# Patient Record
Sex: MALE | Race: WHITE | Employment: UNEMPLOYED | URBAN - METROPOLITAN AREA
[De-identification: names, ages, dates, MRNs, and addresses within clinical notes are randomized per-mention and may not be internally consistent; named-entity substitution may affect disease eponyms.]

---

## 2017-01-12 ENCOUNTER — TELEPHONE (OUTPATIENT)
Dept: FAMILY MEDICINE | Facility: CLINIC | Age: 42
End: 2017-01-12

## 2017-01-12 NOTE — TELEPHONE ENCOUNTER
Date and Result of Last PT/INR:   INR      0.9   11/28/2016  INR      0.9   11/22/2016  INR      5.1   1/29/2016  INR      1.3   1/14/2016  PT      44.8   1/29/2016  PT      23.0   8/28/2014         INR DUE: 12/5/2016    Please call pt to schedule INR - call one time send letter and close encounter     Thank you     Elba Wolff RN, BSN  [facility]

## 2017-01-12 NOTE — Clinical Note
[address] [address]  Phone: [phone]   January 12, 2017    Mike Joseph  [address] [facility] [address]      Dear Mike,    My staff have been attempting to reach you: you are due for an INR     Your last INR was on 11/28/2016, an INR was due 12/5/16.      We need to monitor your INR while you are on coumadin or also known as warfarin. This blood test is a vital part in your treatment to ensure you are taking the correct dose.     Please call the clinic to make this appointment.     We look forward to seeing you    Anticoagulation nursing staff at [place]    Sincerely,            Torito Powers M.D./RN Anticoagulation staff

## 2017-01-12 NOTE — TELEPHONE ENCOUNTER
My staff have been attempting to reach you: you are due for an INR     Your last INR was on 11/28/2016, an INR was due 12/5/16.      We need to monitor you INR while you are on coumadin or also know as warfarin. This blood test is a vital part in your treatment to ensure you are taking the correct dose.     Please call the clinic to make this appointment.     We look forward to seeing you    Anticoagulation nursing staff at [place]      Left message, sent letter.  Dottie Hodge RN

## 2017-01-14 DIAGNOSIS — I82.409 DVT (DEEP VENOUS THROMBOSIS) (H): ICD-10-CM

## 2017-01-14 DIAGNOSIS — Z79.01 LONG-TERM (CURRENT) USE OF ANTICOAGULANTS: Primary | ICD-10-CM

## 2017-01-16 NOTE — TELEPHONE ENCOUNTER
warfarin    Last Written Prescription Date: 11/28/2016  Last Fill Qty: 30, # refills: 0  Last Office [facility] or [facility] prescribing provider: 1/14/2016       Date and Result of Last PT/INR:   INR      0.9   11/28/2016  INR      0.9   11/22/2016  INR      5.1   1/29/2016  INR      1.3   1/14/2016  PT      44.8   1/29/2016  PT      23.0   8/28/2014     Mable Hodge contacted [place] on 01/16/2017 and left a message. If patient calls back please schedule INR appointment as soon as possible and contact RN team.  Letter sent last week regarding pt needing OV/INR as well.  Dottie Hodge RN

## 2017-01-20 NOTE — TELEPHONE ENCOUNTER
Pharmacy calling back regarding pts script, was notified of below, we have tried to contact pt.  Pharmacy will notify pt.  Dottie Hodge RN

## 2017-01-30 NOTE — TELEPHONE ENCOUNTER
Called # [phone]    Left a non detailed VM     Elba Wolff RN, BSN  [facility]

## 2017-02-03 RX ORDER — WARFARIN SODIUM 5 MG/1
TABLET ORAL
Qty: 7 TABLET | Refills: 0 | Status: SHIPPED | OUTPATIENT
Start: 2017-02-03 | End: 2017-03-07

## 2017-02-03 NOTE — TELEPHONE ENCOUNTER
Attempt # 3    Called # [phone]    Pt made an OV for 2/7/2017     Huddled with Md Kishan Jay for 7 days worth     Refill per RN protocol     Elba Wolff RN, BSN  [facility]

## 2017-03-07 ENCOUNTER — OFFICE VISIT (OUTPATIENT)
Dept: FAMILY MEDICINE | Facility: CLINIC | Age: 42
End: 2017-03-07
Payer: MEDICARE

## 2017-03-07 VITALS
HEART RATE: 85 BPM | HEIGHT: 73 IN | OXYGEN SATURATION: 98 % | BODY MASS INDEX: 32.47 KG/M2 | DIASTOLIC BLOOD PRESSURE: 80 MMHG | WEIGHT: 245 LBS | TEMPERATURE: 98.6 F | SYSTOLIC BLOOD PRESSURE: 120 MMHG

## 2017-03-07 DIAGNOSIS — Z86.711 PERSONAL HISTORY OF PULMONARY EMBOLISM: ICD-10-CM

## 2017-03-07 DIAGNOSIS — F17.200 TOBACCO DEPENDENCE SYNDROME: ICD-10-CM

## 2017-03-07 DIAGNOSIS — Z13.6 CARDIOVASCULAR SCREENING; LDL GOAL LESS THAN 160: ICD-10-CM

## 2017-03-07 DIAGNOSIS — Z00.00 ROUTINE GENERAL MEDICAL EXAMINATION AT A HEALTH CARE FACILITY: Primary | ICD-10-CM

## 2017-03-07 DIAGNOSIS — F39 EPISODIC MOOD DISORDER (H): ICD-10-CM

## 2017-03-07 DIAGNOSIS — Z86.711 HISTORY OF PULMONARY EMBOLISM: ICD-10-CM

## 2017-03-07 DIAGNOSIS — I82.4Y1 DEEP VEIN THROMBOSIS (DVT) OF PROXIMAL VEIN OF RIGHT LOWER EXTREMITY, UNSPECIFIED CHRONICITY (H): ICD-10-CM

## 2017-03-07 DIAGNOSIS — S06.9X0D TRAUMATIC BRAIN INJURY, WITHOUT LOSS OF CONSCIOUSNESS, SUBSEQUENT ENCOUNTER: ICD-10-CM

## 2017-03-07 DIAGNOSIS — Z79.01 LONG-TERM (CURRENT) USE OF ANTICOAGULANTS: ICD-10-CM

## 2017-03-07 LAB
ANION GAP SERPL CALCULATED.3IONS-SCNC: 6 MMOL/L (ref 3–14)
BUN SERPL-MCNC: 14 MG/DL (ref 7–30)
CALCIUM SERPL-MCNC: 8.8 MG/DL (ref 8.5–10.1)
CHLORIDE SERPL-SCNC: 108 MMOL/L (ref 94–109)
CO2 SERPL-SCNC: 27 MMOL/L (ref 20–32)
CREAT SERPL-MCNC: 0.89 MG/DL (ref 0.66–1.25)
ERYTHROCYTE [DISTWIDTH] IN BLOOD BY AUTOMATED COUNT: 11.9 % (ref 10–15)
GFR SERPL CREATININE-BSD FRML MDRD: NORMAL ML/MIN/1.7M2
GLUCOSE SERPL-MCNC: 89 MG/DL (ref 70–99)
HCT VFR BLD AUTO: 41.6 % (ref 40–53)
HGB BLD-MCNC: 14.6 G/DL (ref 13.3–17.7)
MCH RBC QN AUTO: 30.9 PG (ref 26.5–33)
MCHC RBC AUTO-ENTMCNC: 35.1 G/DL (ref 31.5–36.5)
MCV RBC AUTO: 88 FL (ref 78–100)
PLATELET # BLD AUTO: 302 10E9/L (ref 150–450)
POTASSIUM SERPL-SCNC: 4.3 MMOL/L (ref 3.4–5.3)
RBC # BLD AUTO: 4.73 10E12/L (ref 4.4–5.9)
SODIUM SERPL-SCNC: 141 MMOL/L (ref 133–144)
WBC # BLD AUTO: 7.6 10E9/L (ref 4–11)

## 2017-03-07 PROCEDURE — 36415 COLL VENOUS BLD VENIPUNCTURE: CPT | Performed by: FAMILY MEDICINE

## 2017-03-07 PROCEDURE — 99396 PREV VISIT EST AGE 40-64: CPT | Performed by: FAMILY MEDICINE

## 2017-03-07 PROCEDURE — 80048 BASIC METABOLIC PNL TOTAL CA: CPT | Performed by: FAMILY MEDICINE

## 2017-03-07 PROCEDURE — 85027 COMPLETE CBC AUTOMATED: CPT | Performed by: FAMILY MEDICINE

## 2017-03-07 RX ORDER — WARFARIN SODIUM 4 MG/1
4 TABLET ORAL DAILY
Qty: 90 TABLET | Refills: 1 | Status: SHIPPED | OUTPATIENT
Start: 2017-03-07

## 2017-03-07 RX ORDER — WARFARIN SODIUM 5 MG/1
5 TABLET ORAL DAILY
Qty: 90 TABLET | Refills: 1 | Status: SHIPPED | OUTPATIENT
Start: 2017-03-07

## 2017-03-07 NOTE — LETTER
[address] [address]                  [phone]   March 8, 2017    Mike Joseph  [address] [place] [address]      Dear Mike,    Here is a summary of your recent test results:    -Kidney function is normal (Cr, GFR), Sodium is normal, Potassium is normal, Calcium is normal, Glucose is normal (diabetes screening test).   -Normal red blood cell (hgb) levels, normal white blood cell count and normal platelet levels.     For additional lab test information, labtestsonline.org is an excellent reference.     Your test results are enclosed.      Please contact me if you have any questions.    In addition, here is a list of due or overdue Health Maintenance reminders.    Health Maintenance Due   Topic Date Due     INR CLINIC REFERRAL - yearly  02/27/2013     Cholesterol Lab - every 5 years  09/28/2014       Please call us at [phone] (or use [employer]) to address the above recommendations.            Thank you very much for trusting [facility]..     Healthy regards,          Torito Powers M.D.        Results for orders placed or performed in visit on 03/07/17   CBC with platelets   Result Value Ref Range    WBC 7.6 4.0 - 11.0 10e9/L    RBC Count 4.73 4.4 - 5.9 10e12/L    Hemoglobin 14.6 13.3 - 17.7 g/dL    Hematocrit 41.6 40.0 - 53.0 %    MCV 88 78 - 100 fl    MCH 30.9 26.5 - 33.0 pg    MCHC 35.1 31.5 - 36.5 g/dL    RDW 11.9 10.0 - 15.0 %    Platelet Count 302 150 - 450 10e9/L   Basic metabolic panel   Result Value Ref Range    Sodium 141 133 - 144 mmol/L    Potassium 4.3 3.4 - 5.3 mmol/L    Chloride 108 94 - 109 mmol/L    Carbon Dioxide 27 20 - 32 mmol/L    Anion Gap 6 3 - 14 mmol/L    Glucose 89 70 - 99 mg/dL    Urea Nitrogen 14 7 - 30 mg/dL    Creatinine 0.89 0.66 - 1.25 mg/dL    GFR Estimate >90  Non  GFR Calc   >60 mL/min/1.7m2    GFR Estimate If Black >90   GFR Calc   >60  mL/min/1.7m2    Calcium 8.8 8.5 - 10.1 mg/dL

## 2017-03-07 NOTE — NURSING NOTE
"Chief Complaint   Patient presents with     Physical       Initial /80  Pulse 85  Temp 98.6  F (37  C) (Oral)  Ht 6' 1\" (1.854 m)  Wt 245 lb (111.1 kg)  SpO2 98%  BMI 32.32 kg/m2 Estimated body mass index is 32.32 kg/(m^2) as calculated from the following:    Height as of this encounter: 6' 1\" (1.854 m).    Weight as of this encounter: 245 lb (111.1 kg).  Medication Reconciliation: complete  "

## 2017-03-07 NOTE — MR AVS SNAPSHOT
After Visit Summary   3/7/2017    Mike Joseph    MRN: [id number]           Patient Information     Date Of Birth          1975        Visit Information        Provider Department      3/7/2017 11:40 AM Vivek Powers MD [facility]        Today's Diagnoses     Routine general medical examination at a health care facility    -  1    Long-term (current) use of anticoagulants [Z79.01]        CARDIOVASCULAR SCREENING; LDL GOAL LESS THAN 160        History of pulmonary embolism - 7/2011        Traumatic brain injury, without loss of consciousness, subsequent encounter        Deep vein thrombosis (DVT) of proximal vein of right lower extremity, unspecified chronicity (H)        Personal history of venous thrombosis and embolism        Personal history of pulmonary embolism        Tobacco dependence syndrome          Care Instructions      Preventive Health Recommendations  Male Ages 40 to 49    Yearly exam:             See your health care provider every year in order to  o   Review health changes.   o   Discuss preventive care.    o   Review your medicines if your doctor has prescribed any.    You should be tested each year for STDs (sexually transmitted diseases) if you re at risk.     Have a cholesterol test every 5 years.     Have a colonoscopy (test for colon cancer) if someone in your family has had colon cancer or polyps before age 50.     After age 45, have a diabetes test (fasting glucose). If you are at risk for diabetes, you should have this test every 3 years.      Talk with your health care provider about whether or not a prostate cancer screening test (PSA) is right for you.    Shots: Get a flu shot each year. Get a tetanus shot every 10 years.     Nutrition:    Eat at least 5 servings of fruits and vegetables daily.     Eat whole-grain bread, whole-wheat pasta and brown rice instead of white grains and rice.     Talk to your provider about Calcium and Vitamin D.  "    Lifestyle    Exercise for at least 150 minutes a week (30 minutes a day, 5 days a week). This will help you control your weight and prevent disease.     Limit alcohol to one drink per day.     No smoking.     Wear sunscreen to prevent skin cancer.     See your dentist every six months for an exam and cleaning.            Follow-ups after your visit        Follow-up notes from your care team     Return in about 1 year (around 3/7/2018) for Wellness Visit with fasting labs.      Who to contact     If you have questions or need follow up information about today's clinic visit or your schedule please contact [facility] directly at [phone].  Normal or non-critical lab and imaging results will be communicated to you by MyChart, letter or phone within 4 business days after the clinic has received the results. If you do not hear from us within 7 days, please contact the clinic through [employer] or phone. If you have a critical or abnormal lab result, we will notify you by phone as soon as possible.  Submit refill requests through [employer] or call your pharmacy and they will forward the refill request to us. Please allow 3 business days for your refill to be completed.          Additional Information About Your Visit        [employer]d Information     [employer] lets you send messages to your doctor, view your test results, renew your prescriptions, schedule appointments and more. To sign up, go to [email or web address] . Click on \"Log in\" on the left side of the screen, which will take you to the Welcome page. Then click on \"Sign up Now\" on the right side of the page.     You will be asked to enter the access code listed below, as well as some personal information. Please follow the directions to create your username and password.     Your access code is: [email or web address]  Expires: 2017 12:44 PM     Your access code will  in 90 days. If you need help or a new code, please call your [facility] or " "[phone].        Care EveryWhere ID     This is your Care EveryWhere ID. This could be used by other organizations to access your [facility] medical records  [id number]        Your Vitals Were     Pulse Temperature Height Pulse Oximetry BMI (Body Mass Index)       85 98.6  F (37  C) (Oral) 6' 1\" (1.854 m) 98% 32.32 kg/m2        Blood Pressure from Last 3 Encounters:   03/07/17 120/80   01/14/16 122/80   07/21/15 130/89    Weight from Last 3 Encounters:   03/07/17 245 lb (111.1 kg)   01/14/16 250 lb (113.4 kg)   07/21/15 251 lb 9 oz (114.1 kg)              We Performed the Following     Basic metabolic panel     CBC with platelets          Today's Medication Changes          These changes are accurate as of: 3/7/17 12:44 PM.  If you have any questions, ask your nurse or doctor.               Start taking these medicines.        Dose/Directions    varenicline 0.5 MG X 11 & 1 MG X 42 tablet   Commonly known as:  CHANTIX STARTING MONTH ZACARIAS   Used for:  Tobacco dependence syndrome   Started by:  Vivek Powers MD        Take 0.5 mg tab daily for 3 days, then 0.5 mg tab twice daily for 4 days, then 1 mg twice daily.   Quantity:  53 tablet   Refills:  0         These medicines have changed or have updated prescriptions.        Dose/Directions    * warfarin 5 MG tablet   Commonly known as:  COUMADIN   This may have changed:  See the new instructions.   Used for:  Long-term (current) use of anticoagulants   Changed by:  Vivek Powers MD        Dose:  5 mg   Take 1 tablet (5 mg) by mouth daily   Quantity:  90 tablet   Refills:  1       * warfarin 4 MG tablet   Commonly known as:  COUMADIN   This may have changed:  See the new instructions.   Used for:  History of pulmonary embolism   Changed by:  Vivek Powers MD        Dose:  4 mg   Take 1 tablet (4 mg) by mouth daily   Quantity:  90 tablet   Refills:  1       * Notice:  This list has 2 medication(s) that are the same as other medications prescribed for you. " Read the directions carefully, and ask your doctor or other care provider to review them with you.      Stop taking these medicines if you haven't already. Please contact your care team if you have questions.     FLUoxetine 20 MG capsule   Commonly known as:  PROzac   Stopped by:  Vivek Powers MD                Where to get your medicines      These medications were sent to [facility] - [place], [place] - [address]  [address] [facility] [address]     Phone:  [phone]     varenicline 0.5 MG X 11 & 1 MG X 42 tablet    warfarin 4 MG tablet    warfarin 5 MG tablet                Primary Care Provider Office Phone # Fax #    Vivek Powers [phone]       [facility] [address] [place] [address]        Thank you!     Thank you for choosing [facility]  for your care. Our goal is always to provide you with excellent care. Hearing back from our patients is one way we can continue to improve our services. Please take a few minutes to complete the written survey that you may receive in the mail after your visit with us. Thank you!             Your Updated Medication List - Protect others around you: Learn how to safely use, store and throw away your medicines at www.disposemymeds.org.          This list is accurate as of: 3/7/17 12:44 PM.  Always use your most recent med list.                   Brand Name Dispense Instructions for use    acetaminophen 500 MG tablet    TYLENOL    100 tablet    Take 1-2 tablets (500-1,000 mg) by mouth every 6 hours as needed for mild pain       varenicline 0.5 MG X 11 & 1 MG X 42 tablet    CHANTIX STARTING MONTH ZACARIAS    53 tablet    Take 0.5 mg tab daily for 3 days, then 0.5 mg tab twice daily for 4 days, then 1 mg twice daily.       * warfarin 5 MG tablet    COUMADIN    90 tablet    Take 1 tablet (5 mg) by mouth daily       * warfarin 4 MG tablet    COUMADIN    90 tablet    Take 1 tablet (4 mg) by mouth  daily       * Notice:  This list has 2 medication(s) that are the same as other medications prescribed for you. Read the directions carefully, and ask your doctor or other care provider to review them with you.

## 2017-03-07 NOTE — PROGRESS NOTES
SUBJECTIVE:     CC: Mike Joseph is an 41 year old male who presents for preventative health visit.     Healthy Habits:    Do you get at least three servings of calcium containing foods daily (dairy, green leafy vegetables, etc.)? yes    Amount of exercise or daily activities, outside of work: no    Problems taking medications regularly No    Medication side effects: No    Have you had an eye exam in the past two years? no    Do you see a dentist twice per year? yes    Do you have sleep apnea, excessive snoring or daytime drowsiness?daytime drowsiness    Recurrent Blood Clots:  The patient previously had a blood clot in his leg and he was placed on coumadin. He has not been taking coumadin recently due to running out of the prescription. The patient is currently smoking cigarettes. He has a family history of thrombophilia.       Today's PHQ-2 Score:   PHQ-2 ( 1999 [employer]) 7/21/2015 4/17/2014   Q1: Little interest or pleasure in doing things 0 0   Q2: Feeling down, depressed or hopeless 0 0   PHQ-2 Score 0 0       Abuse: Current or Past(Physical, Sexual or Emotional)- No  Do you feel safe in your environment - Yes    Social History   Substance Use Topics     Smoking status: Current Every Day Smoker     Packs/day: 0.50     Types: Cigarettes     Smokeless tobacco: Never Used     Alcohol use Yes     The patient does not drink >3 drinks per day nor >7 drinks per week.    Last PSA: No results found for: PSA    Recent Labs   Lab Test  09/28/09   1553   CHOL  181   HDL  58   LDL  111   TRIG  58   CHOLHDLRATIO  3.1       Reviewed orders with patient. Reviewed health maintenance and updated orders accordingly - Yes    Reviewed and updated as needed this visit by clinical staff  Tobacco  Allergies  Meds  Problems  Med Hx  Surg Hx  Fam Hx  Soc Hx          Reviewed and updated as needed this visit by Provider  Allergies  Meds  Problems            ROS:  Constitutional, HEENT, cardiovascular, pulmonary, GI, ,  "musculoskeletal, neuro, skin, endocrine and psych systems are negative, except as otherwise noted.    Problem list, Medication list, Allergies, and Medical/Social/Surgical histories reviewed in [facility] and updated as appropriate.    This document serves as a record of the services and decisions personally performed and made by Vivek Powers MD. It was created on his behalf by Shanel Biswas, a trained medical scribe. The creation of this document is based on the provider's statements to the medical scribe.  Shanel Biswas 8:42 AM 3/7/2017  OBJECTIVE:     /80  Pulse 85  Temp 98.6  F (37  C) (Oral)  Ht 6' 1\" (1.854 m)  Wt 245 lb (111.1 kg)  SpO2 98%  BMI 32.32 kg/m2  EXAM:  GENERAL: healthy, alert and no distress  EYES: Eyes grossly normal to inspection, PERRL and conjunctivae and sclerae normal  HENT: ear canals and TM's normal, nose and mouth without ulcers or lesions  NECK: no adenopathy, no asymmetry, masses, or scars and thyroid normal to palpation  RESP: lungs clear to auscultation - no rales, rhonchi or wheezes  BREAST: normal without masses, tenderness or nipple discharge and no palpable axillary masses or adenopathy  CV: regular rate and rhythm, normal S1 S2, no S3 or S4, no murmur, click or rub, no peripheral edema and peripheral pulses strong  ABDOMEN: soft, nontender, no hepatosplenomegaly, no masses and bowel sounds normal   (male): normal male genitalia without lesions or urethral discharge, no hernia  MS: no gross musculoskeletal defects noted, no edema  SKIN: no suspicious lesions or rashes  NEURO: Normal strength and tone, mentation intact and speech normal  PSYCH: mentation appears normal, affect normal/bright  LYMPH: no cervical, supraclavicular, axillary, or inguinal adenopathy    Diagnostic Results:  Results for orders placed or performed in visit on 03/07/17 (from the past 24 hour(s))   CBC with platelets   Result Value Ref Range    WBC 7.6 4.0 - 11.0 10e9/L    RBC Count 4.73 4.4 - 5.9 " "10e12/L    Hemoglobin 14.6 13.3 - 17.7 g/dL    Hematocrit 41.6 40.0 - 53.0 %    MCV 88 78 - 100 fl    MCH 30.9 26.5 - 33.0 pg    MCHC 35.1 31.5 - 36.5 g/dL    RDW 11.9 10.0 - 15.0 %    Platelet Count 302 150 - 450 10e9/L     ASSESSMENT/PLAN:     Mike was seen today for physical.    Diagnoses and all orders for this visit:    Routine general medical examination at a health care facility  -     CBC with platelets  -     Basic metabolic panel        History of pulmonary embolism - 7/2011 - uncontrolled - continue medication.  -     warfarin (COUMADIN) 4 MG tablet; Take 1 tablet (4 mg) by mouth daily    Traumatic brain injury, without loss of consciousness, subsequent encounter - stable and no change    Long-term (current) use of anticoagulants [Z79.01] - uncontrolled - continue medication.  Personal history of venous thrombosis and embolism/Personal history of pulmonary embolism - off and on coumadin use - ran out of meds after recent no show - restart and continue medication.  -     warfarin (COUMADIN) 5 MG tablet; Take 1 tablet (5 mg) by mouth daily    Tobacco dependence syndrome - tobacco cessation discussed, start taking chantix  -     varenicline (CHANTIX STARTING MONTH ZACARIAS) 0.5 MG X 11 & 1 MG X 42 tablet; Take 0.5 mg tab daily for 3 days, then 0.5 mg tab twice daily for 4 days, then 1 mg twice daily.          COUNSELING:  Reviewed preventive health counseling, as reflected in patient instructions       Regular exercise       Healthy diet/nutrition       Vision screening       Hearing screening       reports that he has been smoking Cigarettes.  He has been smoking about 0.50 packs per day. He has never used smokeless tobacco.  Tobacco Cessation Action Plan: Information offered: Patient not interested at this time  Estimated body mass index is 32.32 kg/(m^2) as calculated from the following:    Height as of this encounter: 6' 1\" (1.854 m).    Weight as of this encounter: 245 lb (111.1 kg).   Weight management " plan: Discussed healthy diet and exercise guidelines and patient will follow up in 12 months in clinic to re-evaluate.    Counseling Resources:  ATP IV Guidelines  Pooled Cohorts Equation Calculator  FRAX Risk Assessment  ICSI Preventive Guidelines  Dietary Guidelines for Americans, 2010  USDA's MyPlate  ASA Prophylaxis  Lung CA Screening    The information in this document, created by a scribe for me, accurately reflects the services I personally performed and the decisions made by me. I have reviewed and approved this document for accuracy.    Vivek Powers MD  [facility] [place]

## 2017-03-13 DIAGNOSIS — F43.22 ADJUSTMENT DISORDER WITH ANXIOUS MOOD: ICD-10-CM

## 2017-03-13 NOTE — TELEPHONE ENCOUNTER
Mable Hodge contacted Mike on 03/13/17 and left a message. If patient calls back please contact RN team.  Dottie Hodge RN

## 2017-03-13 NOTE — TELEPHONE ENCOUNTER
fluoxetine       Last Written Prescription Date: 11/2/2015  Last Fill Quantity: 90; # refills: 0  Last Office Visit with [facility], P or [facility] prescribing provider:  3/7/2017        Last PHQ-9 score on record=   PHQ-9 SCORE 7/21/2015   Total Score 4       Lab Results   Component Value Date    AST 25 01/29/2016     Lab Results   Component Value Date    ALT 33 01/29/2016     Pt requesting this, was taken off med list at last OV.  Please advise.    Routing to PCP for further review/recommendations/orders.  Dottie Hodge RN

## 2017-03-27 ENCOUNTER — TELEPHONE (OUTPATIENT)
Dept: FAMILY MEDICINE | Facility: CLINIC | Age: 42
End: 2017-03-27

## 2017-03-27 NOTE — TELEPHONE ENCOUNTER
Pt stated that he was offered this at his PX on 3/7/2017 but pt declined - he then spoke with his girlfriend and his girlfriend is telling pt that needs to have this medication to help with his mood     Please review and advise     Thank you     Elba Wolff RN, BSN  [facility]

## 2017-03-27 NOTE — TELEPHONE ENCOUNTER
Pt due for an INR     Date and Result of Last PT/INR:   Lab Results   Component Value Date    INR 0.9 11/28/2016    INR 0.9 11/22/2016    INR 5.1 01/29/2016    PT 44.8 01/29/2016    PT 23.0 08/28/2014      Called # [phone]  Pt stated he will be over in a few minutes to have an INR done     Letter sent       Elba Wolff RN, BSN  [facility]

## 2017-03-27 NOTE — LETTER
[address]  [facility] [address]  Phone: [phone]   March 27, 2017    Mike Joseph  [address] [place] [address]      Dear Mike,    My staff have been attempting to reach you: you are due for an INR     Your last INR was on 11/28/2016     We need to monitor you INR while you are on coumadin or also know as warfarin. This blood test is a vital part in your treatment to ensure you are taking the correct dose.     Please call the clinic to make this appointment.     We look forward to seeing you    Anticoagulation nursing staff at [place]      Sincerely,            Torito Powers M.D./[place],RN

## 2017-05-22 ENCOUNTER — TELEPHONE (OUTPATIENT)
Dept: FAMILY MEDICINE | Facility: CLINIC | Age: 42
End: 2017-05-22

## 2017-05-22 ENCOUNTER — ANTICOAGULATION THERAPY VISIT (OUTPATIENT)
Dept: NURSING | Facility: CLINIC | Age: 42
End: 2017-05-22

## 2017-05-22 DIAGNOSIS — I82.409 DVT (DEEP VENOUS THROMBOSIS) (H): ICD-10-CM

## 2017-05-22 DIAGNOSIS — Z51.81 ANTICOAGULATION GOAL OF INR 2 TO 3: ICD-10-CM

## 2017-05-22 DIAGNOSIS — Z86.711 HISTORY OF PULMONARY EMBOLISM: ICD-10-CM

## 2017-05-22 DIAGNOSIS — Z79.01 LONG-TERM (CURRENT) USE OF ANTICOAGULANTS: ICD-10-CM

## 2017-05-22 DIAGNOSIS — Z79.01 ANTICOAGULATION GOAL OF INR 2 TO 3: ICD-10-CM

## 2017-05-22 NOTE — TELEPHONE ENCOUNTER
Pt due for INR appt, overdue. Attempted to call pt.     They will do the lab at another clinic. They will have this faxed here.    Dottie Hodge RN

## 2017-09-11 ENCOUNTER — ANTICOAGULATION THERAPY VISIT (OUTPATIENT)
Dept: NURSING | Facility: CLINIC | Age: 42
End: 2017-09-11

## 2017-09-11 DIAGNOSIS — Z79.01 ANTICOAGULATION GOAL OF INR 2 TO 3: ICD-10-CM

## 2017-09-11 DIAGNOSIS — Z51.81 ANTICOAGULATION GOAL OF INR 2 TO 3: ICD-10-CM

## 2017-09-11 DIAGNOSIS — I82.409 DVT (DEEP VENOUS THROMBOSIS) (H): ICD-10-CM

## 2017-09-11 DIAGNOSIS — Z86.711 HISTORY OF PULMONARY EMBOLISM: ICD-10-CM

## 2017-09-11 DIAGNOSIS — Z79.01 LONG-TERM (CURRENT) USE OF ANTICOAGULANTS: ICD-10-CM

## 2017-11-06 ENCOUNTER — TELEPHONE (OUTPATIENT)
Dept: FAMILY MEDICINE | Facility: CLINIC | Age: 42
End: 2017-11-06

## 2017-11-06 NOTE — TELEPHONE ENCOUNTER
Date Forms was received: November 6, 2017    Forms received by: Fax [facility]    Last office visit: 03/07/2017/     Purpose of Form:  Approving dental treatment.local anesthetic/     When the form is due:  ASAP    How the form needs to be returned for patient:  Fax [phone]    Form currently placed  [place]

## 2017-11-06 NOTE — TELEPHONE ENCOUNTER
Huddled with MD Peirre - pt needs to have an INR done before he is cleared     Called # [phone] (home)   Left a non detailed VM     Elba Wolff RN, BSN  [facility]

## 2018-04-02 ENCOUNTER — TELEPHONE (OUTPATIENT)
Dept: FAMILY MEDICINE | Facility: CLINIC | Age: 43
End: 2018-04-02

## 2018-04-02 NOTE — TELEPHONE ENCOUNTER
Pt is due for an INR        Date and Result of Last PT/INR:   Lab Results   Component Value Date    INR 0.9 11/28/2016    INR 0.9 11/22/2016    INR 5.1 01/29/2016    PT 44.8 01/29/2016    PT 23.0 08/28/2014            Attempt # 1    Called #   Telephone Information:   Mobile [phone]     The above number in not in service    no other numbers on file     Letter sent     Elba Wolff RN, BSN  [facility]

## 2018-04-02 NOTE — LETTER
[address] [address]                  [phone]   April 2, 2018    Mike Joseph  [address] [place] [address]      Dear Mike,    My staff have been attempting to reach you: you are due for an INR     Your last INR was on 11/28/2016     We need to monitor you INR while you are on coumadin or also know as warfarin. This blood test is a vital part in your treatment to ensure you are taking the correct dose.     Please call the clinic to make this appointment.       We look forward to seeing you     Your Anticoagulation nursing staff at [place]